# Patient Record
Sex: MALE | NOT HISPANIC OR LATINO | URBAN - METROPOLITAN AREA
[De-identification: names, ages, dates, MRNs, and addresses within clinical notes are randomized per-mention and may not be internally consistent; named-entity substitution may affect disease eponyms.]

---

## 2018-01-24 ENCOUNTER — EMERGENCY (EMERGENCY)
Facility: HOSPITAL | Age: 38
LOS: 0 days | Discharge: HOME | End: 2018-01-24
Attending: EMERGENCY MEDICINE | Admitting: EMERGENCY MEDICINE
Payer: COMMERCIAL

## 2018-01-24 VITALS — HEIGHT: 72 IN | WEIGHT: 274.92 LBS

## 2018-01-24 VITALS
DIASTOLIC BLOOD PRESSURE: 63 MMHG | OXYGEN SATURATION: 100 % | HEART RATE: 99 BPM | RESPIRATION RATE: 19 BRPM | TEMPERATURE: 98 F | SYSTOLIC BLOOD PRESSURE: 126 MMHG

## 2018-01-24 DIAGNOSIS — J11.1 INFLUENZA DUE TO UNIDENTIFIED INFLUENZA VIRUS WITH OTHER RESPIRATORY MANIFESTATIONS: ICD-10-CM

## 2018-01-24 DIAGNOSIS — R05 COUGH: ICD-10-CM

## 2018-01-24 PROCEDURE — 99284 EMERGENCY DEPT VISIT MOD MDM: CPT

## 2018-01-24 PROCEDURE — 93010 ELECTROCARDIOGRAM REPORT: CPT

## 2018-01-24 PROCEDURE — 71046 X-RAY EXAM CHEST 2 VIEWS: CPT | Mod: 26

## 2018-01-24 RX ORDER — SODIUM CHLORIDE 9 MG/ML
2000 INJECTION INTRAMUSCULAR; INTRAVENOUS; SUBCUTANEOUS ONCE
Qty: 0 | Refills: 0 | Status: COMPLETED | OUTPATIENT
Start: 2018-01-24 | End: 2018-01-24

## 2018-01-24 RX ORDER — ACETAMINOPHEN 500 MG
1000 TABLET ORAL ONCE
Qty: 0 | Refills: 0 | Status: COMPLETED | OUTPATIENT
Start: 2018-01-24 | End: 2018-01-24

## 2018-01-24 RX ADMIN — Medication 1000 MILLIGRAM(S): at 12:12

## 2018-01-24 RX ADMIN — SODIUM CHLORIDE 1000 MILLILITER(S): 9 INJECTION INTRAMUSCULAR; INTRAVENOUS; SUBCUTANEOUS at 12:11

## 2018-01-24 NOTE — ED ADULT NURSE NOTE - OBJECTIVE STATEMENT
patient sent from urgent care for +flu A and cp.  patient states he started to feel sick on sunday with chest pressure, cough and myalgias.  cp is nonradiating substernal which he thinks could be r/t indigestion.  he also reports feeling dizzy while taking a shower today which prompted him to seek medical attention. he tested + for flu at urgent care.  ECG shows sinus tachycardia

## 2018-01-24 NOTE — ED STATDOCS - MEDICAL DECISION MAKING DETAILS
No acute disease on CXR.  Pt with influenza like symptoms.  Supportive care.  D/c home, f/u with PCP.

## 2018-01-24 NOTE — ED STATDOCS - OBJECTIVE STATEMENT
36 y/o M with no PMHx presents to the ED c/o chest heaviness, myalgias, cough and fever starting on Sunday night which developed into body aches and dizziness. Pt states he had difficulty breathing on Sunday due to the cough. Cough is not productive. Pt notes dizziness and felt like he was going to pass out this morning so he went to an urgent care. Pt sent to the ED from an urgent care after he tested positive for the flu.

## 2021-07-12 PROBLEM — Z00.00 ENCOUNTER FOR PREVENTIVE HEALTH EXAMINATION: Status: ACTIVE | Noted: 2021-07-12

## 2021-08-27 ENCOUNTER — APPOINTMENT (OUTPATIENT)
Dept: FAMILY MEDICINE | Facility: CLINIC | Age: 41
End: 2021-08-27

## 2021-12-15 ENCOUNTER — TRANSCRIPTION ENCOUNTER (OUTPATIENT)
Age: 41
End: 2021-12-15

## 2022-02-28 ENCOUNTER — TRANSCRIPTION ENCOUNTER (OUTPATIENT)
Age: 42
End: 2022-02-28

## 2022-08-17 DIAGNOSIS — E03.9 HYPOTHYROIDISM, UNSPECIFIED: ICD-10-CM

## 2022-08-17 DIAGNOSIS — E66.9 OBESITY, UNSPECIFIED: ICD-10-CM

## 2022-08-17 DIAGNOSIS — I10 ESSENTIAL (PRIMARY) HYPERTENSION: ICD-10-CM

## 2022-08-17 DIAGNOSIS — E78.5 HYPERLIPIDEMIA, UNSPECIFIED: ICD-10-CM

## 2022-08-30 ENCOUNTER — OUTPATIENT (OUTPATIENT)
Dept: OUTPATIENT SERVICES | Facility: HOSPITAL | Age: 42
LOS: 1 days | Discharge: HOME | End: 2022-08-30

## 2022-08-30 DIAGNOSIS — R79.9 ABNORMAL FINDING OF BLOOD CHEMISTRY, UNSPECIFIED: ICD-10-CM

## 2022-09-01 ENCOUNTER — APPOINTMENT (OUTPATIENT)
Dept: HEMATOLOGY ONCOLOGY | Facility: CLINIC | Age: 42
End: 2022-09-01
Payer: COMMERCIAL

## 2022-09-01 DIAGNOSIS — Z83.3 FAMILY HISTORY OF DIABETES MELLITUS: ICD-10-CM

## 2022-09-08 ENCOUNTER — RESULT REVIEW (OUTPATIENT)
Age: 42
End: 2022-09-08

## 2022-09-08 ENCOUNTER — APPOINTMENT (OUTPATIENT)
Dept: INFUSION THERAPY | Facility: CLINIC | Age: 42
End: 2022-09-08

## 2022-09-08 ENCOUNTER — APPOINTMENT (OUTPATIENT)
Dept: HEMATOLOGY ONCOLOGY | Facility: CLINIC | Age: 42
End: 2022-09-08
Payer: COMMERCIAL

## 2022-09-08 VITALS
HEART RATE: 94 BPM | WEIGHT: 296.38 LBS | BODY MASS INDEX: 40.59 KG/M2 | OXYGEN SATURATION: 97 % | TEMPERATURE: 97.6 F | HEIGHT: 71.65 IN | DIASTOLIC BLOOD PRESSURE: 78 MMHG | RESPIRATION RATE: 18 BRPM | SYSTOLIC BLOOD PRESSURE: 125 MMHG

## 2022-09-08 DIAGNOSIS — Z80.7 FAMILY HISTORY OF OTHER MALIGNANT NEOPLASMS OF LYMPHOID, HEMATOPOIETIC AND RELATED TISSUES: ICD-10-CM

## 2022-09-08 DIAGNOSIS — U07.1 COVID-19: ICD-10-CM

## 2022-09-08 DIAGNOSIS — G47.33 OBSTRUCTIVE SLEEP APNEA (ADULT) (PEDIATRIC): ICD-10-CM

## 2022-09-08 DIAGNOSIS — Z78.9 OTHER SPECIFIED HEALTH STATUS: ICD-10-CM

## 2022-09-08 DIAGNOSIS — R63.5 ABNORMAL WEIGHT GAIN: ICD-10-CM

## 2022-09-08 DIAGNOSIS — T38.7X5A ADVERSE EFFECT OF ANDROGENS AND ANABOLIC CONGENERS, INITIAL ENCOUNTER: ICD-10-CM

## 2022-09-08 DIAGNOSIS — D75.1 SECONDARY POLYCYTHEMIA: ICD-10-CM

## 2022-09-08 DIAGNOSIS — Z63.5 DISRUPTION OF FAMILY BY SEPARATION AND DIVORCE: ICD-10-CM

## 2022-09-08 LAB
BASOPHILS # BLD AUTO: 0.04 K/UL — SIGNIFICANT CHANGE UP (ref 0–0.2)
BASOPHILS NFR BLD AUTO: 0.5 % — SIGNIFICANT CHANGE UP (ref 0–2)
EOSINOPHIL # BLD AUTO: 0.36 K/UL — SIGNIFICANT CHANGE UP (ref 0–0.5)
EOSINOPHIL NFR BLD AUTO: 4.6 % — SIGNIFICANT CHANGE UP (ref 0–6)
HCT VFR BLD CALC: 49.7 % — SIGNIFICANT CHANGE UP (ref 39–50)
HGB BLD-MCNC: 17.2 G/DL — HIGH (ref 13–17)
IMM GRANULOCYTES NFR BLD AUTO: 0.9 % — SIGNIFICANT CHANGE UP (ref 0–1.5)
LYMPHOCYTES # BLD AUTO: 2.01 K/UL — SIGNIFICANT CHANGE UP (ref 1–3.3)
LYMPHOCYTES # BLD AUTO: 25.6 % — SIGNIFICANT CHANGE UP (ref 13–44)
MCHC RBC-ENTMCNC: 30 PG — SIGNIFICANT CHANGE UP (ref 27–34)
MCHC RBC-ENTMCNC: 34.6 GM/DL — SIGNIFICANT CHANGE UP (ref 32–36)
MCV RBC AUTO: 86.6 FL — SIGNIFICANT CHANGE UP (ref 80–100)
MONOCYTES # BLD AUTO: 0.71 K/UL — SIGNIFICANT CHANGE UP (ref 0–0.9)
MONOCYTES NFR BLD AUTO: 9 % — SIGNIFICANT CHANGE UP (ref 2–14)
NEUTROPHILS # BLD AUTO: 4.66 K/UL — SIGNIFICANT CHANGE UP (ref 1.8–7.4)
NEUTROPHILS NFR BLD AUTO: 59.4 % — SIGNIFICANT CHANGE UP (ref 43–77)
NRBC # BLD: 0 /100 WBCS — SIGNIFICANT CHANGE UP (ref 0–0)
PLATELET # BLD AUTO: 217 K/UL — SIGNIFICANT CHANGE UP (ref 150–400)
RBC # BLD: 5.74 M/UL — SIGNIFICANT CHANGE UP (ref 4.2–5.8)
RBC # FLD: 12.4 % — SIGNIFICANT CHANGE UP (ref 10.3–14.5)
WBC # BLD: 7.85 K/UL — SIGNIFICANT CHANGE UP (ref 3.8–10.5)
WBC # FLD AUTO: 7.85 K/UL — SIGNIFICANT CHANGE UP (ref 3.8–10.5)

## 2022-09-08 PROCEDURE — 99205 OFFICE O/P NEW HI 60 MIN: CPT

## 2022-09-08 PROCEDURE — 99204 OFFICE O/P NEW MOD 45 MIN: CPT

## 2022-09-08 RX ORDER — SIMVASTATIN 80 MG/1
TABLET, FILM COATED ORAL
Refills: 0 | Status: ACTIVE | COMMUNITY

## 2022-09-08 RX ORDER — LOSARTAN POTASSIUM AND HYDROCHLOROTHIAZIDE 12.5; 1 MG/1; MG/1
TABLET ORAL
Refills: 0 | Status: ACTIVE | COMMUNITY

## 2022-09-08 RX ORDER — ORLISTAT 100 %
POWDER (GRAM) MISCELLANEOUS
Refills: 0 | Status: ACTIVE | COMMUNITY

## 2022-09-08 RX ORDER — LEVOTHYROXINE SODIUM 0.17 MG/1
TABLET ORAL
Refills: 0 | Status: ACTIVE | COMMUNITY

## 2022-09-08 SDOH — SOCIAL STABILITY - SOCIAL INSECURITY: DISRUPTION OF FAMILY BY SEPARATION AND DIVORCE: Z63.5

## 2022-09-08 NOTE — PHYSICAL EXAM
[Obese] : obese [Normal] : affect appropriate [de-identified] : plethoric faces [de-identified] : anicteric [de-identified] : very large neck [de-identified] : no rashes

## 2022-09-08 NOTE — HISTORY OF PRESENT ILLNESS
[de-identified] : MY LOPEZ is a 42 y.o. 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 \par \par \par Started testosterone ~ 4 months ago due to low T. \par 6/16/22 - WBC: 6.4, Hgb: 18.6, Hct: 56.0,  Plts: 150\par 7/12/22 - WBC: 6.8, Hgb: 18.3, Hct: 53.7,  Plts: 216\par \par Was told his "iron is high" and was instructed to go to the blood center to donate a unit of blood. When he got there, however he was turned away - presumably due to elevated Hgb. \par Patient does have sleep apnea - states does not use mask at all as cannot tolerate. \par He admitted to having the above labs done when he was fasting.  Did not have any fluids during this time. \par Else feels drinks quite well. \par No personal or family hx of bleeding or clotting disorder. \par

## 2022-09-08 NOTE — ASSESSMENT
[FreeTextEntry1] : Patient is a 42 y.o. 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  \par Since he only started testosterone a few months ago it is possible he may have had a pre-existing polycythemia. \par Reviewed the differences between a primary and secondary erythrocytosis. \par Discussed that he is unlikely to have a primary erythrocytosis since he has a few risk factors for secondary erythrocytosis.  An EPO level was sent off, should this come back lower than normal this would be suspicious for a primary erythrocytosis and a JAK2 mutation can be sent at that time. \par \par He has had sleep apnea x years and is noncompliant with wearing his mask.  This alone can be a trigger  secondary erythropoiesis.  \par We reviewed that testosterone can fuel erythropoiesis and that an increased H/H is a known side effect and that we need to monitor for this.   \par Also he may be having a relative erythrocytosis due to dehydration - was told that even if he is having fasting labs done he can drink water prior.  His H/H today done nonfasting was essentially normal, much lower than the #'s he has been getting in his PCP's office. \par \par Discussed risk of high H/H - increased risk of VTE's.  Would recommend phlebotomies to keep Hct <50 - 52. \par Reviewed that the Hemochromatosis Program thru [facility] does not apply to him since he does not have Hemochromatosis!!!  (He had been given this form for us to fill out). \par [facility] currently dose not accept patient's with erythrocytosis from testosterone therapy. \par "• Indefinite deferral for secondary polycythemia (donors requiring RBC reduction due to testosterone therapy).\par NOTE: It is acceptable for red cell collection only if the blood center has FDA approval.\par Currently, [facility] does not have FDA approval to accept donors with this condition."  \par  \par Patient very afraid of needles.  Although Hct today <50, will have him get phleb today since he does have plethoric faces and runs high H/H at other office. Also our RN's are very experienced in managing phobic patients. \par Patient was then instructed to RTO 3 months for CBC+ possible phleb - sooner should outside CBC show needed. \par Again reminded to drink plenty of water before having labs or phlebotomies done. \par All questions answered.  Phlebotomy consent form signed. \par \par \par Dr. Sam Jeter\par Rosetta Smith\par \par

## 2022-09-09 DIAGNOSIS — D75.1 SECONDARY POLYCYTHEMIA: ICD-10-CM

## 2022-09-09 DIAGNOSIS — T38.7X5A ADVERSE EFFECT OF ANDROGENS AND ANABOLIC CONGENERS, INITIAL ENCOUNTER: ICD-10-CM

## 2022-09-09 DIAGNOSIS — G47.33 OBSTRUCTIVE SLEEP APNEA (ADULT) (PEDIATRIC): ICD-10-CM

## 2022-09-10 LAB — EPO SERPL-MCNC: 7.8 MIU/ML — SIGNIFICANT CHANGE UP (ref 2.6–18.5)

## 2022-12-05 ENCOUNTER — OUTPATIENT (OUTPATIENT)
Dept: OUTPATIENT SERVICES | Facility: HOSPITAL | Age: 42
LOS: 1 days | Discharge: HOME | End: 2022-12-05

## 2022-12-05 DIAGNOSIS — G47.33 OBSTRUCTIVE SLEEP APNEA (ADULT) (PEDIATRIC): ICD-10-CM

## 2022-12-05 DIAGNOSIS — D75.1 SECONDARY POLYCYTHEMIA: ICD-10-CM

## 2022-12-08 ENCOUNTER — APPOINTMENT (OUTPATIENT)
Dept: HEMATOLOGY ONCOLOGY | Facility: CLINIC | Age: 42
End: 2022-12-08

## 2022-12-08 ENCOUNTER — APPOINTMENT (OUTPATIENT)
Dept: INFUSION THERAPY | Facility: CLINIC | Age: 42
End: 2022-12-08